# Patient Record
Sex: MALE | Race: WHITE | Employment: STUDENT | URBAN - METROPOLITAN AREA
[De-identification: names, ages, dates, MRNs, and addresses within clinical notes are randomized per-mention and may not be internally consistent; named-entity substitution may affect disease eponyms.]

---
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
## 2020-07-26 ENCOUNTER — HOSPITAL ENCOUNTER (OUTPATIENT)
Age: 20
Discharge: HOME | End: 2020-07-26
Attending: FAMILY MEDICINE
Payer: COMMERCIAL

## 2020-07-26 VITALS
OXYGEN SATURATION: 100 % | HEART RATE: 65 BPM | TEMPERATURE: 99 F | WEIGHT: 155 LBS | HEIGHT: 74 IN | DIASTOLIC BLOOD PRESSURE: 58 MMHG | BODY MASS INDEX: 19.89 KG/M2 | RESPIRATION RATE: 18 BRPM | SYSTOLIC BLOOD PRESSURE: 123 MMHG

## 2020-07-26 DIAGNOSIS — S51.012A LACERATION OF LEFT ELBOW, INITIAL ENCOUNTER: Primary | ICD-10-CM

## 2020-07-26 PROCEDURE — 99202 OFFICE O/P NEW SF 15 MIN: CPT | Performed by: FAMILY MEDICINE

## 2020-07-26 PROCEDURE — 12001 RPR S/N/AX/GEN/TRNK 2.5CM/<: CPT | Performed by: FAMILY MEDICINE

## 2020-07-26 NOTE — ED PROVIDER NOTES
Patient Seen in: [facility] In [address] [place] [address]      History   Patient presents with:  Laceration    Stated Complaint: lt elbow lac    HPI    Pt is a 24 yo with a left elbow laceration that occurred this morning. Last tetanus in 2012.  Elin irrigation and sterile prep, 3 simple interrupted sutures were placed 5-0 not absorbable, Tolerated well wound care instructions given    MDM                 Disposition and Plan     Clinical Impression:  Laceration of left elbow, initial encounter  (prima

## 2020-07-26 NOTE — ED INITIAL ASSESSMENT (HPI)
Patient was wrestling with brother and landed left elbow  +laceration noted to medial elbow  Patient reports continuous bleeding from wound

## (undated) NOTE — LETTER
3/7/2018              RAY Portillo 80         Immunization History   Administered Date(s) Administered   • >=3 YRS FLUZONE OR FLUARIX QUAD PRESERVE FREE SINGLE DOSE (85886) FLU CLINIC 10/24/2015   • DTAP

## (undated) NOTE — Clinical Note
2/6/2017              [address] [place] [address]        [address] [address]       Please excuse Milus Lightning from gym for the next 2 weeks. He is out with hip pain. Any questions please feel free to contact my office. Thank you.      Sincerely,    Zhen

## (undated) NOTE — LETTER
Name:  Nuvia Benavidez School Year:  12th Grade Class: Student ID No.:   Address:  [address] [place] [address],[address] [address] Phone:  [phone] (home)  :  25year old   Name Relationship [address] Work Phone Home Phone Mobile Phone   1.  INGRID PLASENCIA syndrome, arrhythmogenic right ventricular cardiomyopathy, long QT syndrome, short QT syndrome, Brugada syndrome, or catecholaminergic polymorphic ventricular tachycardia?      13. Does anyone in your family have a heart problem, pacemaker, or implanted def 39. Do you have a history of seizure disorder?     37. Do you have headaches with exercise? 38. Have you ever had numbness, tingling, or weakness in your arms or legs after being hit or falling?      39.Have you ever been unable to move your arms / legs excavatum,      arachnodactyly, arm span > height, hyperlaxity, myopia, MVP, aortic insufficiency) Yes    Eyes/Ears/Nose/Throat:  Pupils equal    Hearing Yes    Lymph nodes Yes    Heart*  · Murmurs (auscultation standing, supine, +/- Valsalva)  · Location that I/our student will not use performance-enhancing substances as defined in the [facility] Performance-Enhancing Substance Testing Program Protocol.  We have reviewed the policy and understand that I/our student may be asked to submit to testing for the presen

## (undated) NOTE — LETTER
Name:  Марина aMjor School Year:  11th Grade Class: Student ID No.:   Address:  [address] [place] [address],[address] [address] Phone:  [phone] (home)  : 89[birth year] 16year old   Name Relationship [address] Work Phone Home Phone Mobile Phone   1.  INGRID PLASENCIA syndrome, arrhythmogenic right ventricular cardiomyopathy, long QT syndrome, short QT syndrome, Brugada syndrome, or catecholaminergic polymorphic ventricular tachycardia?      13. Does anyone in your family have a heart problem, pacemaker, or implanted def 39. Do you have a history of seizure disorder?     37. Do you have headaches with exercise? 38. Have you ever had numbness, tingling, or weakness in your arms or legs after being hit or falling?      39.Have you ever been unable to move your arms / legs Appearance:  Marfan stigmata (kyphoscoliosis, high-arched palate, pectus excavatum,      arachnodactyly, arm span > height, hyperlaxity, myopia, MVP, aortic insufficiency) Yes    Eyes/Ears/Nose/Throat:  Pupils equal    Hearing Yes    Lymph nodes Yes    Hea As a prerequisite to participation in [employer] athletic activities, we agree that I/our student will not use performance-enhancing substances as defined in the [facility] Performance-Enhancing Substance Testing Program Protocol.  We have reviewed the policy and under

## (undated) NOTE — MR AVS SNAPSHOT
[address] [facility]  [phone]               Thank you for choosing us for your health care visit with Geri Jha. DO Paul.   We are glad to serve you and happy to provide you with this sum visit, view other health information and more. To sign up or find more information on getting   Proxy Access to your child’s MyChart go to [email or web address] and click on the   Sign Up Forms link in the Additional Information box on the right. o Eating low-fat dairy products like yogurt, milk, and cheese  o Regularly eating meals together as a family  o Limiting fast food, take out food, and eating out at restaurants  o Preparing foods at home as a family  o Eating a diet rich in calcium  o [address] [place] [address]

## (undated) NOTE — LETTER
VACCINE ADMINISTRATION RECORD  PARENT / GUARDIAN APPROVAL  Date: 2019  Vaccine administered to: Ernie Lauren     : 2000    MRN: [id number]    A copy of the appropriate Centers for Disease Control and Prevention Vaccine Information statement

## (undated) NOTE — Clinical Note
2/6/2017              [address]         Please consider this an order for physical therapy. 2 times a week for 6 weeks. DX- Left Hip Strain. Sincerely,    Hal Hook.  DO Paul  [facility]

## (undated) NOTE — LETTER
VACCINE ADMINISTRATION RECORD  PARENT / GUARDIAN APPROVAL  Date: 2018  Vaccine administered to: Harjit Higginbotham     : 2000    MRN: [id number]    A copy of the appropriate Centers for Disease Control and Prevention Vaccine Information statement